# Patient Record
Sex: MALE | Race: WHITE | NOT HISPANIC OR LATINO | Employment: FULL TIME | URBAN - METROPOLITAN AREA
[De-identification: names, ages, dates, MRNs, and addresses within clinical notes are randomized per-mention and may not be internally consistent; named-entity substitution may affect disease eponyms.]

---

## 2018-10-06 ENCOUNTER — OFFICE VISIT (OUTPATIENT)
Dept: URGENT CARE | Facility: CLINIC | Age: 58
End: 2018-10-06
Payer: COMMERCIAL

## 2018-10-06 VITALS
WEIGHT: 248 LBS | BODY MASS INDEX: 33.59 KG/M2 | DIASTOLIC BLOOD PRESSURE: 88 MMHG | SYSTOLIC BLOOD PRESSURE: 140 MMHG | OXYGEN SATURATION: 98 % | TEMPERATURE: 98.8 F | HEIGHT: 72 IN | RESPIRATION RATE: 16 BRPM | HEART RATE: 86 BPM

## 2018-10-06 DIAGNOSIS — J06.9 VIRAL UPPER RESPIRATORY TRACT INFECTION: Primary | ICD-10-CM

## 2018-10-06 PROCEDURE — G0382 LEV 3 HOSP TYPE B ED VISIT: HCPCS | Performed by: PHYSICIAN ASSISTANT

## 2018-10-06 RX ORDER — AZITHROMYCIN 250 MG/1
TABLET, FILM COATED ORAL
Qty: 6 TABLET | Refills: 0 | Status: SHIPPED | OUTPATIENT
Start: 2018-10-06 | End: 2018-10-11

## 2018-10-06 RX ORDER — LISINOPRIL 40 MG/1
1 TABLET ORAL DAILY
COMMUNITY
Start: 2012-05-16

## 2018-10-06 RX ORDER — TRIAMTERENE AND HYDROCHLOROTHIAZIDE 37.5; 25 MG/1; MG/1
1 CAPSULE ORAL DAILY
COMMUNITY
Start: 2012-05-16

## 2018-10-06 NOTE — PROGRESS NOTES
NAME: Claybon Lesch is a 62 y o  male  : 1960    MRN: [id number]      Assessment and Plan   Viral upper respiratory tract infection [J06 9]  1  Viral upper respiratory tract infection  azithromycin (ZITHROMAX) 250 mg tablet         Patient insisted on antibiotic  He states he travels a lot and will only take it if things get worse  Told patient this is most likely viral, he has no asthma or COPD and he should take OTC medications to help him  Offered him a steroid he refused  Patient asking for antibiotic  Patient Instructions   Patient Instructions   Take azithromycin only if no improvement in symptoms in 5-6 days  Mucinex  Start taking antihistamine such as Allegra or Zyrtec  Use over-the-counter Flonase every day  Increased fluids and rest  If no improvement in 5-6 days follow-up with PCP    Proceed to ER if symptoms worsen  History of Present Illness     Patient presents complaining of 1-1/2 day history of cold symptoms  He reports productive yellow mucus and rattling in his lungs  He reports a sore throat, runny nose which is clear  Denies fever, chills, ear pain, shortness of breath, dyspnea, chest tightness, chest pain, sinus pain or pressure  He denies history of asthma, COPD or smoking  He reports he has been taking Advil but nothing else over-the-counter  Review of Systems   Review of Systems   Constitutional: Negative for chills and fever  HENT: Positive for congestion, rhinorrhea and sore throat  Negative for ear pain, sinus pain and sinus pressure  Respiratory: Positive for cough  Negative for chest tightness, shortness of breath and wheezing  Cardiovascular: Negative for chest pain and palpitations           Current Medications       Current Outpatient Prescriptions:     aspirin 81 MG tablet, Take 1 tablet by mouth daily, Disp: , Rfl:     cholecalciferol (VITAMIN D3) 1,000 units tablet, Take 1 tablet by mouth daily, Disp: , Rfl:     lisinopril (ZESTRIL) 40 mg tablet, Take 1 tablet by mouth daily, Disp: , Rfl:     triamterene-hydrochlorothiazide (DYAZIDE) 37 5-25 mg per capsule, Take 1 capsule by mouth daily, Disp: , Rfl:     azithromycin (ZITHROMAX) 250 mg tablet, Take 2 tablets today then 1 tablet daily x 4 days, Disp: 6 tablet, Rfl: 0    Current Allergies     Allergies as of 10/06/2018    (No Known Allergies)              No past medical history on file  No past surgical history on file  No family history on file  Medications have been verified  The following portions of the patient's history were reviewed and updated as appropriate: allergies, current medications, past family history, past medical history, past social history, past surgical history and problem list     Objective   /88   Pulse 86   Temp 98 8 °F (37 1 °C)   Resp 16   Ht 6' (1 829 m)   Wt 112 kg (248 lb)   SpO2 98%   BMI 33 63 kg/m²      Physical Exam     Physical Exam   Constitutional: He appears well-developed and well-nourished  No distress  HENT:   TMs clear bilaterally without erythema or bulging  No fluid noted behind the TMs  Nasal mucosa is pale without edema  Clear rhinorrhea present  Oropharynx is without erythema, edema or exudates  +PND   Cardiovascular: Normal rate, regular rhythm and normal heart sounds  Pulmonary/Chest: Effort normal and breath sounds normal  No respiratory distress  He has no wheezes  He has no rales  Lymphadenopathy:     He has no cervical adenopathy

## 2018-10-06 NOTE — PATIENT INSTRUCTIONS
Take azithromycin only if no improvement in symptoms in 5-6 days  Mucinex  Start taking antihistamine such as Allegra or Zyrtec  Use over-the-counter Flonase every day  Increased fluids and rest  If no improvement in 5-6 days follow-up with PCP

## 2019-01-14 ENCOUNTER — OFFICE VISIT (OUTPATIENT)
Dept: URGENT CARE | Facility: CLINIC | Age: 59
End: 2019-01-14
Payer: COMMERCIAL

## 2019-01-14 VITALS
OXYGEN SATURATION: 96 % | TEMPERATURE: 99.3 F | DIASTOLIC BLOOD PRESSURE: 84 MMHG | WEIGHT: 249 LBS | HEART RATE: 90 BPM | SYSTOLIC BLOOD PRESSURE: 138 MMHG | BODY MASS INDEX: 33.72 KG/M2 | RESPIRATION RATE: 16 BRPM | HEIGHT: 72 IN

## 2019-01-14 DIAGNOSIS — J02.9 SORE THROAT: ICD-10-CM

## 2019-01-14 DIAGNOSIS — J06.9 VIRAL URI WITH COUGH: Primary | ICD-10-CM

## 2019-01-14 LAB — S PYO AG THROAT QL: NEGATIVE

## 2019-01-14 PROCEDURE — 87430 STREP A AG IA: CPT | Performed by: PHYSICIAN ASSISTANT

## 2019-01-14 PROCEDURE — 87070 CULTURE OTHR SPECIMN AEROBIC: CPT | Performed by: PHYSICIAN ASSISTANT

## 2019-01-14 PROCEDURE — G0382 LEV 3 HOSP TYPE B ED VISIT: HCPCS | Performed by: PHYSICIAN ASSISTANT

## 2019-01-14 RX ORDER — FLUTICASONE PROPIONATE 50 MCG
1 SPRAY, SUSPENSION (ML) NASAL DAILY
Qty: 1 BOTTLE | Refills: 0 | Status: SHIPPED | OUTPATIENT
Start: 2019-01-14

## 2019-01-14 NOTE — PROGRESS NOTES
[facility]        NAME: Peggy Chappell is a 62 y o  male  : 1960    MRN: [id number]  DATE: 2019  TIME: 1:11 PM    Assessment and Plan   Viral URI with cough [J06 9, B97 89]  1  Viral URI with cough  fluticasone (FLONASE) 50 mcg/act nasal spray   2  Sore throat  Throat culture         Patient Instructions     Symptoms consistent with viral URI  Continue mucinex daily  Flonase nasal spray, twice daily for 3 days then once a day until congestion resolves  Watch for fevers or symptoms lasting longer than 2 weeks  Follow up with PCP in 3-5 days  Proceed to  ER if symptoms worsen  Chief Complaint     Chief Complaint   Patient presents with    Sore Throat     x 2 days; used Chloraseptic spray today    Cold Like Symptoms     Mucinex DM last pm         History of Present Illness       This is a 62year old male presenting for URI symptoms x 2 days  He reports sinus congestion, rhinorrhea, bilateral ear pain, sore throat, cough productive of yellow mucus in the mornings  No fevers, but he has felt chilled at times  No abdominal pain, nausea, vomiting, diarrhea  He has been taking mucinex with some relief of symptoms  He recently traveled via airplane to [facility] and has been exposed to many sick people  No history of asthma, COPD, smoking, or pneumonia  No shortness of breath  Review of Systems   Review of Systems   Constitutional: Positive for chills and fatigue  Negative for fever  HENT: Positive for congestion, ear pain, postnasal drip, rhinorrhea, sinus pressure and sore throat  Negative for ear discharge  Respiratory: Positive for cough  Negative for shortness of breath  Gastrointestinal: Negative for abdominal pain, diarrhea, nausea and vomiting  Skin: Negative for rash  Neurological: Negative for dizziness and headaches           Current Medications       Current Outpatient Prescriptions:     aspirin 81 MG tablet, Take 1 tablet by mouth daily, Disp: , Rfl:    cholecalciferol (VITAMIN D3) 1,000 units tablet, Take 1 tablet by mouth daily, Disp: , Rfl:     fluticasone (FLONASE) 50 mcg/act nasal spray, 1 spray into each nostril daily, Disp: 1 Bottle, Rfl: 0    lisinopril (ZESTRIL) 40 mg tablet, Take 1 tablet by mouth daily, Disp: , Rfl:     triamterene-hydrochlorothiazide (DYAZIDE) 37 5-25 mg per capsule, Take 1 capsule by mouth daily, Disp: , Rfl:     Current Allergies     Allergies as of 01/14/2019    (No Known Allergies)            The following portions of the patient's history were reviewed and updated as appropriate: allergies, current medications, past family history, past medical history, past social history, past surgical history and problem list      No past medical history on file  No past surgical history on file  No family history on file  Medications have been verified  Objective   /84 (BP Location: Left arm, Patient Position: Sitting, Cuff Size: Large)   Pulse 90   Temp 99 3 °F (37 4 °C) (Tympanic Core)   Resp 16   Ht 6' (1 829 m)   Wt 113 kg (249 lb)   SpO2 96%   BMI 33 77 kg/m²        Physical Exam     Physical Exam   Constitutional: He appears well-developed and well-nourished  No distress  HENT:   Head: Normocephalic  Right Ear: External ear and ear canal normal  A middle ear effusion is present  Left Ear: External ear and ear canal normal  A middle ear effusion is present  Nose: Mucosal edema and rhinorrhea present  Mouth/Throat: Uvula is midline and mucous membranes are normal  Posterior oropharyngeal edema and posterior oropharyngeal erythema present  No oropharyngeal exudate  Eyes: Conjunctivae are normal    Neck: Normal range of motion  Neck supple  Cardiovascular: Normal rate, regular rhythm, normal heart sounds and intact distal pulses  Pulmonary/Chest: Effort normal and breath sounds normal  No respiratory distress  He has no decreased breath sounds  He has no wheezes  He has no rhonchi   He has no rales    Frequent coughing on exam  Normal work of breathing, full lung expansion, lungs CTA throughout  Lymphadenopathy:     He has no cervical adenopathy  Neurological: He is alert  Skin: Skin is warm and dry  He is not diaphoretic  Nursing note and vitals reviewed

## 2019-01-14 NOTE — PATIENT INSTRUCTIONS
Symptoms consistent with viral URI  Continue mucinex daily  Flonase nasal spray, twice daily for 3 days then once a day until congestion resolves  Watch for fevers or symptoms lasting longer than 2 weeks  Follow up with your PCP for persistent symptoms  Go to the ER for any distress

## 2019-01-16 LAB — BACTERIA THROAT CULT: NORMAL
